# Patient Record
Sex: MALE | Race: WHITE | NOT HISPANIC OR LATINO | URBAN - METROPOLITAN AREA
[De-identification: names, ages, dates, MRNs, and addresses within clinical notes are randomized per-mention and may not be internally consistent; named-entity substitution may affect disease eponyms.]

---

## 2019-11-17 ENCOUNTER — INPATIENT (INPATIENT)
Facility: HOSPITAL | Age: 54
LOS: 0 days | Discharge: HOME | End: 2019-11-18
Attending: SURGERY | Admitting: SURGERY
Payer: COMMERCIAL

## 2019-11-17 VITALS
SYSTOLIC BLOOD PRESSURE: 161 MMHG | TEMPERATURE: 97 F | RESPIRATION RATE: 20 BRPM | DIASTOLIC BLOOD PRESSURE: 87 MMHG | HEART RATE: 125 BPM | OXYGEN SATURATION: 96 %

## 2019-11-17 LAB
ALBUMIN SERPL ELPH-MCNC: 4.6 G/DL — SIGNIFICANT CHANGE UP (ref 3.5–5.2)
ALP SERPL-CCNC: 68 U/L — SIGNIFICANT CHANGE UP (ref 30–115)
ALT FLD-CCNC: 24 U/L — SIGNIFICANT CHANGE UP (ref 0–41)
ANION GAP SERPL CALC-SCNC: 17 MMOL/L — HIGH (ref 7–14)
ANION GAP SERPL CALC-SCNC: 19 MMOL/L — HIGH (ref 7–14)
APTT BLD: 27.4 SEC — SIGNIFICANT CHANGE UP (ref 27–39.2)
AST SERPL-CCNC: 21 U/L — SIGNIFICANT CHANGE UP (ref 0–41)
BASOPHILS # BLD AUTO: 0.05 K/UL — SIGNIFICANT CHANGE UP (ref 0–0.2)
BASOPHILS NFR BLD AUTO: 0.3 % — SIGNIFICANT CHANGE UP (ref 0–1)
BILIRUB SERPL-MCNC: 0.3 MG/DL — SIGNIFICANT CHANGE UP (ref 0.2–1.2)
BLD GP AB SCN SERPL QL: SIGNIFICANT CHANGE UP
BUN SERPL-MCNC: 14 MG/DL — SIGNIFICANT CHANGE UP (ref 10–20)
BUN SERPL-MCNC: 15 MG/DL — SIGNIFICANT CHANGE UP (ref 10–20)
CALCIUM SERPL-MCNC: 9.5 MG/DL — SIGNIFICANT CHANGE UP (ref 8.5–10.1)
CALCIUM SERPL-MCNC: 9.5 MG/DL — SIGNIFICANT CHANGE UP (ref 8.5–10.1)
CHLORIDE SERPL-SCNC: 93 MMOL/L — LOW (ref 98–110)
CHLORIDE SERPL-SCNC: 98 MMOL/L — SIGNIFICANT CHANGE UP (ref 98–110)
CO2 SERPL-SCNC: 21 MMOL/L — SIGNIFICANT CHANGE UP (ref 17–32)
CO2 SERPL-SCNC: 23 MMOL/L — SIGNIFICANT CHANGE UP (ref 17–32)
CREAT SERPL-MCNC: 0.9 MG/DL — SIGNIFICANT CHANGE UP (ref 0.7–1.5)
CREAT SERPL-MCNC: 1.2 MG/DL — SIGNIFICANT CHANGE UP (ref 0.7–1.5)
EOSINOPHIL # BLD AUTO: 0.1 K/UL — SIGNIFICANT CHANGE UP (ref 0–0.7)
EOSINOPHIL NFR BLD AUTO: 0.6 % — SIGNIFICANT CHANGE UP (ref 0–8)
ETHANOL SERPL-MCNC: <10 MG/DL — SIGNIFICANT CHANGE UP
GLUCOSE SERPL-MCNC: 116 MG/DL — HIGH (ref 70–99)
GLUCOSE SERPL-MCNC: 294 MG/DL — HIGH (ref 70–99)
HCT VFR BLD CALC: 48.7 % — SIGNIFICANT CHANGE UP (ref 42–52)
HCT VFR BLD CALC: 50 % — SIGNIFICANT CHANGE UP (ref 42–52)
HGB BLD-MCNC: 16.6 G/DL — SIGNIFICANT CHANGE UP (ref 14–18)
HGB BLD-MCNC: 17.3 G/DL — SIGNIFICANT CHANGE UP (ref 14–18)
IMM GRANULOCYTES NFR BLD AUTO: 0.4 % — HIGH (ref 0.1–0.3)
INR BLD: 1.13 RATIO — SIGNIFICANT CHANGE UP (ref 0.65–1.3)
LACTATE SERPL-SCNC: 3.5 MMOL/L — HIGH (ref 0.5–2.2)
LIDOCAIN IGE QN: 48 U/L — SIGNIFICANT CHANGE UP (ref 7–60)
LYMPHOCYTES # BLD AUTO: 13.1 % — LOW (ref 20.5–51.1)
LYMPHOCYTES # BLD AUTO: 2.07 K/UL — SIGNIFICANT CHANGE UP (ref 1.2–3.4)
MAGNESIUM SERPL-MCNC: 1.9 MG/DL — SIGNIFICANT CHANGE UP (ref 1.8–2.4)
MCHC RBC-ENTMCNC: 31.4 PG — HIGH (ref 27–31)
MCHC RBC-ENTMCNC: 31.5 PG — HIGH (ref 27–31)
MCHC RBC-ENTMCNC: 34.1 G/DL — SIGNIFICANT CHANGE UP (ref 32–37)
MCHC RBC-ENTMCNC: 34.6 G/DL — SIGNIFICANT CHANGE UP (ref 32–37)
MCV RBC AUTO: 90.9 FL — SIGNIFICANT CHANGE UP (ref 80–94)
MCV RBC AUTO: 92.1 FL — SIGNIFICANT CHANGE UP (ref 80–94)
MONOCYTES # BLD AUTO: 0.89 K/UL — HIGH (ref 0.1–0.6)
MONOCYTES NFR BLD AUTO: 5.6 % — SIGNIFICANT CHANGE UP (ref 1.7–9.3)
NEUTROPHILS # BLD AUTO: 12.66 K/UL — HIGH (ref 1.4–6.5)
NEUTROPHILS NFR BLD AUTO: 80 % — HIGH (ref 42.2–75.2)
NRBC # BLD: 0 /100 WBCS — SIGNIFICANT CHANGE UP (ref 0–0)
NRBC # BLD: 0 /100 WBCS — SIGNIFICANT CHANGE UP (ref 0–0)
PHOSPHATE SERPL-MCNC: 3.9 MG/DL — SIGNIFICANT CHANGE UP (ref 2.1–4.9)
PLATELET # BLD AUTO: 223 K/UL — SIGNIFICANT CHANGE UP (ref 130–400)
PLATELET # BLD AUTO: 231 K/UL — SIGNIFICANT CHANGE UP (ref 130–400)
POTASSIUM SERPL-MCNC: 3.8 MMOL/L — SIGNIFICANT CHANGE UP (ref 3.5–5)
POTASSIUM SERPL-MCNC: 3.9 MMOL/L — SIGNIFICANT CHANGE UP (ref 3.5–5)
POTASSIUM SERPL-SCNC: 3.8 MMOL/L — SIGNIFICANT CHANGE UP (ref 3.5–5)
POTASSIUM SERPL-SCNC: 3.9 MMOL/L — SIGNIFICANT CHANGE UP (ref 3.5–5)
PROT SERPL-MCNC: 7.6 G/DL — SIGNIFICANT CHANGE UP (ref 6–8)
PROTHROM AB SERPL-ACNC: 13 SEC — HIGH (ref 9.95–12.87)
RBC # BLD: 5.29 M/UL — SIGNIFICANT CHANGE UP (ref 4.7–6.1)
RBC # BLD: 5.5 M/UL — SIGNIFICANT CHANGE UP (ref 4.7–6.1)
RBC # FLD: 11.9 % — SIGNIFICANT CHANGE UP (ref 11.5–14.5)
RBC # FLD: 12 % — SIGNIFICANT CHANGE UP (ref 11.5–14.5)
SODIUM SERPL-SCNC: 131 MMOL/L — LOW (ref 135–146)
SODIUM SERPL-SCNC: 140 MMOL/L — SIGNIFICANT CHANGE UP (ref 135–146)
WBC # BLD: 15.19 K/UL — HIGH (ref 4.8–10.8)
WBC # BLD: 15.83 K/UL — HIGH (ref 4.8–10.8)
WBC # FLD AUTO: 15.19 K/UL — HIGH (ref 4.8–10.8)
WBC # FLD AUTO: 15.83 K/UL — HIGH (ref 4.8–10.8)

## 2019-11-17 PROCEDURE — 70486 CT MAXILLOFACIAL W/O DYE: CPT | Mod: 26

## 2019-11-17 PROCEDURE — 71045 X-RAY EXAM CHEST 1 VIEW: CPT | Mod: 26

## 2019-11-17 PROCEDURE — 72125 CT NECK SPINE W/O DYE: CPT | Mod: 26

## 2019-11-17 PROCEDURE — 99223 1ST HOSP IP/OBS HIGH 75: CPT

## 2019-11-17 PROCEDURE — 12002 RPR S/N/AX/GEN/TRNK2.6-7.5CM: CPT

## 2019-11-17 PROCEDURE — 72170 X-RAY EXAM OF PELVIS: CPT | Mod: 26

## 2019-11-17 PROCEDURE — 70450 CT HEAD/BRAIN W/O DYE: CPT | Mod: 26

## 2019-11-17 PROCEDURE — 99291 CRITICAL CARE FIRST HOUR: CPT | Mod: 25

## 2019-11-17 RX ORDER — ONDANSETRON 8 MG/1
4 TABLET, FILM COATED ORAL EVERY 6 HOURS
Refills: 0 | Status: DISCONTINUED | OUTPATIENT
Start: 2019-11-17 | End: 2019-11-18

## 2019-11-17 RX ORDER — ACETAMINOPHEN 500 MG
650 TABLET ORAL EVERY 6 HOURS
Refills: 0 | Status: DISCONTINUED | OUTPATIENT
Start: 2019-11-17 | End: 2019-11-18

## 2019-11-17 RX ORDER — FAMOTIDINE 10 MG/ML
20 INJECTION INTRAVENOUS
Refills: 0 | Status: DISCONTINUED | OUTPATIENT
Start: 2019-11-17 | End: 2019-11-18

## 2019-11-17 RX ORDER — INFLUENZA VIRUS VACCINE 15; 15; 15; 15 UG/.5ML; UG/.5ML; UG/.5ML; UG/.5ML
0.5 SUSPENSION INTRAMUSCULAR ONCE
Refills: 0 | Status: DISCONTINUED | OUTPATIENT
Start: 2019-11-17 | End: 2019-11-18

## 2019-11-17 RX ORDER — LEVETIRACETAM 250 MG/1
1000 TABLET, FILM COATED ORAL ONCE
Refills: 0 | Status: COMPLETED | OUTPATIENT
Start: 2019-11-17 | End: 2019-11-17

## 2019-11-17 RX ORDER — LEVETIRACETAM 250 MG/1
500 TABLET, FILM COATED ORAL
Refills: 0 | Status: DISCONTINUED | OUTPATIENT
Start: 2019-11-17 | End: 2019-11-18

## 2019-11-17 RX ORDER — IBUPROFEN 200 MG
600 TABLET ORAL EVERY 6 HOURS
Refills: 0 | Status: DISCONTINUED | OUTPATIENT
Start: 2019-11-17 | End: 2019-11-18

## 2019-11-17 RX ORDER — SODIUM CHLORIDE 9 MG/ML
1000 INJECTION INTRAMUSCULAR; INTRAVENOUS; SUBCUTANEOUS
Refills: 0 | Status: DISCONTINUED | OUTPATIENT
Start: 2019-11-17 | End: 2019-11-18

## 2019-11-17 RX ADMIN — Medication 600 MILLIGRAM(S): at 21:11

## 2019-11-17 RX ADMIN — SODIUM CHLORIDE 100 MILLILITER(S): 9 INJECTION INTRAMUSCULAR; INTRAVENOUS; SUBCUTANEOUS at 19:23

## 2019-11-17 RX ADMIN — Medication 100 MILLIGRAM(S): at 20:31

## 2019-11-17 RX ADMIN — LEVETIRACETAM 400 MILLIGRAM(S): 250 TABLET, FILM COATED ORAL at 18:32

## 2019-11-17 RX ADMIN — Medication 600 MILLIGRAM(S): at 23:00
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
## 2019-11-17 NOTE — H&P ADULT - NSHPLABSRESULTS_GEN_ALL_CORE
Labs:  CAPILLARY BLOOD GLUCOSE      POCT Blood Glucose.: 261 mg/dL (17 Nov 2019 15:04)                          17.3   15.83 )-----------( 231      ( 17 Nov 2019 15:11 )             50.0       Auto Neutrophil %: 80.0 % (11-17-19 @ 15:11)  Auto Immature Granulocyte %: 0.4 % (11-17-19 @ 15:11)    11-17    131<L>  |  93<L>  |  15  ----------------------------<  294<H>  3.8   |  21  |  1.2      Calcium, Total Serum: 9.5 mg/dL (11-17-19 @ 15:11)      LFTs:             7.6  | 0.3  | 21       ------------------[68      ( 17 Nov 2019 15:11 )  4.6  | x    | 24          Lipase:48     Amylase:x         Lactate, Blood: 3.5 mmol/L (11-17-19 @ 15:11)      Coags:     13.00  ----< 1.13    ( 17 Nov 2019 15:11 )     27.4                < from: CT Cervical Spine No Cont (11.17.19 @ 16:36) >      No evidence of acute fracture, compression deformity or facet   subluxation.      < end of copied text >  < from: CT Head No Cont (11.17.19 @ 16:36) >    iMPRESSION:    1.  Nondisplaced fracture right temporal bone with focus of underlying   pneumocephalus.    2.  Right posterior temporoparietal lobe subarachnoid hemorrhage.    < end of copied text >  < from: CT Maxillofacial No Cont (11.17.19 @ 16:36) >    No evidence of acute fracture.    < end of copied text > Labs:  CAPILLARY BLOOD GLUCOSE      POCT Blood Glucose.: 261 mg/dL (17 Nov 2019 15:04)                          17.3   15.83 )-----------( 231      ( 17 Nov 2019 15:11 )             50.0       Auto Neutrophil %: 80.0 % (11-17-19 @ 15:11)  Auto Immature Granulocyte %: 0.4 % (11-17-19 @ 15:11)    11-17    131<L>  |  93<L>  |  15  ----------------------------<  294<H>  3.8   |  21  |  1.2      Calcium, Total Serum: 9.5 mg/dL (11-17-19 @ 15:11)      LFTs:             7.6  | 0.3  | 21       ------------------[68      ( 17 Nov 2019 15:11 )  4.6  | x    | 24          Lipase:48     Amylase:x         Lactate, Blood: 3.5 mmol/L (11-17-19 @ 15:11)      Coags:     13.00  ----< 1.13    ( 17 Nov 2019 15:11 )     27.4       IMAGING  < from: CT Cervical Spine No Cont (11.17.19 @ 16:36) >  No evidence of acute fracture, compression deformity or facet   subluxation.    < end of copied text >    < from: CT Head No Cont (11.17.19 @ 16:36) >  iMPRESSION:  1.  Nondisplaced fracture right temporal bone with focus of underlying   pneumocephalus.  2.  Right posterior temporoparietal lobe subarachnoid hemorrhage.  < end of copied text >    < from: CT Maxillofacial No Cont (11.17.19 @ 16:36) >  No evidence of acute fracture.  < end of copied text >

## 2019-11-17 NOTE — ED PROVIDER NOTE - DIAGNOSTIC INTERPRETATION
ER Physician: FLAVIO Prieto M.D.  CHEST XRAY INTERPRETATION: lungs clear, heart shadow normal, bony structures intact. no rib fx or ptx.
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
## 2019-11-17 NOTE — ED PROVIDER NOTE - PROGRESS NOTE DETAILS
NSX consulted s/o Dr. Hollingsworth, f/u CT and trauma consult PT C/O DECREASED HEARING FROM R EAR AND CONTINUED BLEEDING. TRAUMA AWARE. ENT CONSULTED. NEUROSURGERY PA AT PTS BEDSIDE, ADMIT TO TRAUMA FLOOR, BETTE PAGAN, WILL REASSESS.
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
## 2019-11-17 NOTE — CONSULT NOTE ADULT - ATTENDING COMMENTS
Pt seen and examined on rounds. Agree with above. No neurosurgical intervention at this time.
see admission H&P
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
## 2019-11-17 NOTE — ED PROVIDER NOTE - NS ED ROS FT
Constitutional: No altered mental status.  Eyes: No visual changes.  ENT: +r sided hearing loss  Neck: No neck pain or stiffness.  Cardiovascular: No chest pain, palpitations.  Pulmonary: No SOB. No hemoptysis.  Abdominal: No abdominal pain, nausea, vomiting.   : No hematuria.  Neuro: No headache, syncope, dizziness. +Head trauma  MS: No back pain. No deformities.  Psych: No suicidal ideations.
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
## 2019-11-17 NOTE — ED ADULT NURSE NOTE - NSIMPLEMENTINTERV_GEN_ALL_ED
Implemented All Universal Safety Interventions:  [place] to call system. Call bell, personal items and telephone within reach. Instruct patient to call for assistance. Room bathroom lighting operational. Non-slip footwear when patient is off stretcher. Physically safe environment: no spills, clutter or unnecessary equipment. Stretcher in lowest position, wheels locked, appropriate side rails in place.
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
## 2019-11-17 NOTE — ED PROVIDER NOTE - PHYSICAL EXAMINATION
Constitutional: Well developed, well nourished. NAD  TRAUMA: ABC intact. GCS 15.   Head: Normocephalic, 3-4 cm laceration to R temporal bone  Eyes: PERRL. EOMI. No Raccoon eyes.   ENT: No nasal discharge. No septal hematoma. No Khan sign. Mucous membranes moist. +R hemotympanum  Neck: Supple. Painless ROM. No midline tenderness, stepoffs.  Cardiovascular: Normal S1, S2. Regular rate and rhythm. No murmurs, rubs, or gallops.  Pulmonary: Normal respiratory rate and effort. Lungs clear to auscultation bilaterally. No wheezing, rales, or rhonchi.  CHEST: No chest wall tenderness, crepitus.  Abdominal: Soft. Nondistended. Nontender. No rebound, guarding, rigidity.  BACK: No midline T/L/S tenderness, stepoffs. No saddle paresthesia.  Extremities. Pelvis stable. No traumatic deformities, tenderness of extremities.  Skin: No rashes, cyanosis, abrasions.  Neuro: AAOx3. Strength 5/5 in all extremities. Sensation intact throughout. No focal neurological deficits.  Psych: Normal mood. Normal affect.

## 2019-11-18 VITALS
OXYGEN SATURATION: 98 % | HEART RATE: 83 BPM | TEMPERATURE: 98 F | SYSTOLIC BLOOD PRESSURE: 141 MMHG | RESPIRATION RATE: 18 BRPM | DIASTOLIC BLOOD PRESSURE: 74 MMHG

## 2019-11-18 PROCEDURE — 99223 1ST HOSP IP/OBS HIGH 75: CPT

## 2019-11-18 PROCEDURE — 70450 CT HEAD/BRAIN W/O DYE: CPT | Mod: 26

## 2019-11-18 PROCEDURE — 99232 SBSQ HOSP IP/OBS MODERATE 35: CPT

## 2019-11-18 PROCEDURE — 70480 CT ORBIT/EAR/FOSSA W/O DYE: CPT | Mod: 26,59

## 2019-11-18 PROCEDURE — 99222 1ST HOSP IP/OBS MODERATE 55: CPT

## 2019-11-18 RX ORDER — LEVETIRACETAM 250 MG/1
1 TABLET, FILM COATED ORAL
Qty: 14 | Refills: 0
Start: 2019-11-18 | End: 2019-11-24

## 2019-11-18 RX ORDER — LEVETIRACETAM 250 MG/1
1 TABLET, FILM COATED ORAL
Qty: 0 | Refills: 0 | DISCHARGE
Start: 2019-11-18

## 2019-11-18 RX ADMIN — Medication 600 MILLIGRAM(S): at 08:04

## 2019-11-18 RX ADMIN — LEVETIRACETAM 500 MILLIGRAM(S): 250 TABLET, FILM COATED ORAL at 08:04

## 2019-11-18 NOTE — PROGRESS NOTE ADULT - ATTENDING COMMENTS
I examined the patient with the pa and resident and discussed my plan with them    the patient is neurologically stable, asymptomatic, and ready for discharge.  consultant services can see in oupatient setting for follow-up
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
## 2019-11-18 NOTE — PROGRESS NOTE ADULT - SUBJECTIVE AND OBJECTIVE BOX
GENERAL SURGERY PROGRESS NOTE     KENY ODOM  54y  Male  Hospital day :1d  OVERNIGHT EVENTS: no acute events overnight. Patient was seen by neurosx and ENT.    T(F): 97.9 (11-18-19 @ 07:46), Max: 98.4 (11-18-19 @ 00:29)  HR: 83 (11-18-19 @ 07:46) (76 - 125)  BP: 141/74 (11-18-19 @ 07:46) (135/75 - 161/87)  RR: 18 (11-18-19 @ 07:46) (18 - 20)  SpO2: 98% (11-18-19 @ 07:46) (96% - 99%)    DIET/FLUIDS: sodium chloride 0.9%. 1000 milliLiter(s) IV Continuous <Continuous>    GI proph:  famotidine    Tablet 20 milliGRAM(s) Oral two times a day    PHYSICAL EXAM:  GENERAL: NAD, well-appearing  HEENT: right scalp lac, stapled, no bleeding  CHEST/LUNG: Clear to auscultation bilaterally  HEART: Regular rate and rhythm  ABDOMEN: Soft, Nontender, Nondistended;   EXTREMITIES:  No clubbing, cyanosis, or edema      LABS  Labs:  CAPILLARY BLOOD GLUCOSE      POCT Blood Glucose.: 261 mg/dL (17 Nov 2019 15:04)                          16.6   15.19 )-----------( 223      ( 17 Nov 2019 20:03 )             48.7       Auto Neutrophil %: 80.0 % (11-17-19 @ 15:11)  Auto Immature Granulocyte %: 0.4 % (11-17-19 @ 15:11)    11-17    140  |  98  |  14  ----------------------------<  116<H>  3.9   |  23  |  0.9      Calcium, Total Serum: 9.5 mg/dL (11-17-19 @ 20:03)      LFTs:             7.6  | 0.3  | 21       ------------------[68      ( 17 Nov 2019 15:11 )  4.6  | x    | 24          Lipase:48     Amylase:x         Lactate, Blood: 3.5 mmol/L (11-17-19 @ 15:11)      Coags:     13.00  ----< 1.13    ( 17 Nov 2019 15:11 )     27.4      Alcohol, Blood: <10 mg/dL (11-17-19 @ 15:11)    RADIOLOGY & ADDITIONAL TESTS:  < from: CT Cervical Spine No Cont (11.17.19 @ 16:36) >  IMPRESSION:    No evidence of acute fracture, compression deformity or facet   subluxation.      < end of copied text >    < from: CT Maxillofacial No Cont (11.17.19 @ 16:36) >  IMPRESSION:    No evidence of acute fracture.    < end of copied text >    < from: CT Head No Cont (11.17.19 @ 16:36) >  IMPRESSION:    1.  Nondisplaced fracture right temporal bone with focus of underlying   pneumocephalus.    2.  Right posterior temporoparietal lobe subarachnoid hemorrhage.    These findings discussed with neurosurgery LEXI Little on the   11/17/2019 at 5:41 PM.    < end of copied text >
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
## 2019-11-21 DIAGNOSIS — Z83.3 FAMILY HISTORY OF DIABETES MELLITUS: ICD-10-CM

## 2019-11-21 DIAGNOSIS — S02.19XA OTHER FRACTURE OF BASE OF SKULL, INITIAL ENCOUNTER FOR CLOSED FRACTURE: ICD-10-CM

## 2019-11-21 DIAGNOSIS — S01.01XA LACERATION WITHOUT FOREIGN BODY OF SCALP, INITIAL ENCOUNTER: ICD-10-CM
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
## 2019-11-21 DIAGNOSIS — S06.6X0A TRAUMATIC SUBARACHNOID HEMORRHAGE WITHOUT LOSS OF CONSCIOUSNESS, INITIAL ENCOUNTER: ICD-10-CM

## 2019-11-25 ENCOUNTER — EMERGENCY (EMERGENCY)
Facility: HOSPITAL | Age: 54
LOS: 0 days | Discharge: HOME | End: 2019-11-25
Attending: EMERGENCY MEDICINE | Admitting: EMERGENCY MEDICINE

## 2019-11-25 VITALS — WEIGHT: 250 LBS

## 2019-11-25 VITALS
OXYGEN SATURATION: 99 % | RESPIRATION RATE: 17 BRPM | DIASTOLIC BLOOD PRESSURE: 83 MMHG | HEART RATE: 100 BPM | TEMPERATURE: 97 F | SYSTOLIC BLOOD PRESSURE: 129 MMHG

## 2019-11-25 DIAGNOSIS — S01.01XD LACERATION WITHOUT FOREIGN BODY OF SCALP, SUBSEQUENT ENCOUNTER: ICD-10-CM
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
## 2019-11-25 PROBLEM — I10 ESSENTIAL (PRIMARY) HYPERTENSION: Chronic | Status: ACTIVE | Noted: 2019-11-17

## 2019-11-25 PROBLEM — E11.9 TYPE 2 DIABETES MELLITUS WITHOUT COMPLICATIONS: Chronic | Status: ACTIVE | Noted: 2019-11-17

## 2019-11-25 NOTE — ED PROVIDER NOTE - CLINICAL SUMMARY MEDICAL DECISION MAKING FREE TEXT BOX
pt here for staple removal.  pt has no other complaints.  No evidence of wound infection.  no bleeding.  staples removed.  pt dc with outpatient follow up.

## 2019-11-25 NOTE — ED PROVIDER NOTE - PHYSICAL EXAMINATION
CONST: Well appearing in NAD  HEAD: healing 4 cm laceration right parietal scalp, no overlying skin changes, intact staples, no TTP.   EYES: Sclera and conjunctiva clear.  NECK: Non-tender, no meningeal signs, supple  SKIN: Warm, dry, see head exam   NEURO: A&Ox3, No focal deficits. Strength 5/5 with no sensory deficits. Steady gait
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
## 2019-11-25 NOTE — ED ADULT NURSE NOTE - NSIMPLEMENTINTERV_GEN_ALL_ED
Implemented All Fall Risk Interventions:  [place] to call system. Call bell, personal items and telephone within reach. Instruct patient to call for assistance. Room bathroom lighting operational. Non-slip footwear when patient is off stretcher. Physically safe environment: no spills, clutter or unnecessary equipment. Stretcher in lowest position, wheels locked, appropriate side rails in place. Provide visual cue, wrist band, yellow gown, etc. Monitor gait and stability. Monitor for mental status changes and reorient to person, place, and time. Review medications for side effects contributing to fall risk. Reinforce activity limits and safety measures with patient and family.

## 2019-11-25 NOTE — ED PROVIDER NOTE - NSFOLLOWUPINSTRUCTIONS_ED_ALL_ED_FT
Suture/Staple Removal    After having your stitches or staples removed it is typical to have minor discomfort, swelling, or redness in the area. The wound is still healing so continue to protect it from injury. Keep the wound dry and if given creams, ointments, or medication, take as instructed to by your health care professional.    SEEK IMMEDIATE MEDICAL CARE IF YOU HAVE ANY OF THE FOLLOWING SYMPTOMS: increasing redness/swelling/pain in the wound, pus coming from the wound, bad smell coming from the wound, or fever.    Follow up with your primary medical doctor in 1-2 days

## 2019-11-25 NOTE — ED PROVIDER NOTE - NS ED ROS FT
CONST: No fever, chills or bodyaches  SKIN: + healing laceration.   NEURO: No headache, dizziness, paresthesias or LOC

## 2020-06-30 NOTE — PATIENT PROFILE ADULT - NSPROGENDIFFINTUB_GEN_A_NUR
Bill For Surgical Tray: no X Size Of Lesion In Cm (Optional): 0 Anesthesia Type: 1% lidocaine with epinephrine Billing Type: Third-Party Bill Wound Care: Petrolatum Consent was obtained from the patient. The risks and benefits to therapy were discussed in detail. Specifically, the risks of infection, scarring, bleeding, prolonged wound healing, incomplete removal, allergy to anesthesia, nerve injury and recurrence were addressed. Prior to the procedure, the treatment site was clearly identified and confirmed by the patient. All components of Universal Protocol/PAUSE Rule completed. Path Notes (To The Dermatopathologist): Please check margins. Post-Care Instructions: I reviewed with the patient in detail post-care instructions. Patient is to keep the biopsy site dry overnight, and then apply bacitracin twice daily until healed. Patient may apply hydrogen peroxide soaks to remove any crusting. Was A Bandage Applied: Yes Anesthesia Volume In Cc: 0.5 Hemostasis: Drysol Medical Necessity Clause: This procedure was medically necessary because the lesion that was treated was: Notification Instructions: Patient will be notified of biopsy results. However, patient instructed to call the office if not contacted within 2 weeks. Detail Level: Detailed Medical Necessity Information: It is in your best interest to select a reason for this procedure from the list below. All of these items fulfill various CMS LCD requirements except the new and changing color options. Biopsy Method: Dermablade never intubated

## 2021-07-08 NOTE — PATIENT PROFILE ADULT - HARM RISK FACTORS
Airway patent, TM normal bilaterally, normal appearing mouth, nose, throat, neck supple with full range of motion, no cervical adenopathy.
no

## 2021-11-01 ENCOUNTER — TRANSCRIPTION ENCOUNTER (OUTPATIENT)
Age: 56
End: 2021-11-01

## 2022-07-06 NOTE — ED PROVIDER NOTE - INTERNATIONAL TRAVEL
[Breast Self Exam] : breast self exam [Contraception/ Emergency Contraception/ Safe Sexual Practices] : contraception, emergency contraception, safe sexual practices No

## 2023-07-21 ENCOUNTER — EMERGENCY (EMERGENCY)
Facility: HOSPITAL | Age: 58
LOS: 0 days | Discharge: HOME | End: 2023-07-21
Attending: EMERGENCY MEDICINE
Payer: MEDICAID

## 2023-07-21 VITALS
RESPIRATION RATE: 18 BRPM | HEART RATE: 89 BPM | TEMPERATURE: 98 F | OXYGEN SATURATION: 95 % | DIASTOLIC BLOOD PRESSURE: 101 MMHG | WEIGHT: 154.98 LBS | SYSTOLIC BLOOD PRESSURE: 154 MMHG | HEIGHT: 68 IN

## 2023-07-21 VITALS
HEART RATE: 84 BPM | SYSTOLIC BLOOD PRESSURE: 148 MMHG | OXYGEN SATURATION: 96 % | DIASTOLIC BLOOD PRESSURE: 90 MMHG | RESPIRATION RATE: 18 BRPM
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
## 2023-07-21 DIAGNOSIS — E11.9 TYPE 2 DIABETES MELLITUS WITHOUT COMPLICATIONS: ICD-10-CM

## 2023-07-21 DIAGNOSIS — R07.81 PLEURODYNIA: ICD-10-CM

## 2023-07-21 DIAGNOSIS — I10 ESSENTIAL (PRIMARY) HYPERTENSION: ICD-10-CM

## 2023-07-21 DIAGNOSIS — S29.011A STRAIN OF MUSCLE AND TENDON OF FRONT WALL OF THORAX, INITIAL ENCOUNTER: ICD-10-CM
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
## 2023-07-21 PROCEDURE — 71101 X-RAY EXAM UNILAT RIBS/CHEST: CPT | Mod: RT

## 2023-07-21 PROCEDURE — 71101 X-RAY EXAM UNILAT RIBS/CHEST: CPT | Mod: 26,RT

## 2023-07-21 PROCEDURE — 99284 EMERGENCY DEPT VISIT MOD MDM: CPT

## 2023-07-21 PROCEDURE — 99283 EMERGENCY DEPT VISIT LOW MDM: CPT | Mod: 25

## 2023-07-21 RX ORDER — IBUPROFEN 200 MG
1 TABLET ORAL
Qty: 30 | Refills: 2
Start: 2023-07-21 | End: 2023-08-19

## 2023-07-21 RX ORDER — LIDOCAINE 4 G/100G
1 CREAM TOPICAL ONCE
Refills: 0 | Status: COMPLETED | OUTPATIENT
Start: 2023-07-21 | End: 2023-07-21

## 2023-07-21 RX ORDER — LISINOPRIL/HYDROCHLOROTHIAZIDE 10-12.5 MG
0 TABLET ORAL
Qty: 90 | Refills: 0 | DISCHARGE

## 2023-07-21 RX ORDER — METFORMIN HYDROCHLORIDE 850 MG/1
0 TABLET ORAL
Qty: 180 | Refills: 0 | DISCHARGE

## 2023-07-21 RX ORDER — ATORVASTATIN CALCIUM 80 MG/1
0 TABLET, FILM COATED ORAL
Qty: 90 | Refills: 0 | DISCHARGE

## 2023-07-21 RX ORDER — LIDOCAINE 4 G/100G
1 CREAM TOPICAL
Qty: 7 | Refills: 0
Start: 2023-07-21 | End: 2023-07-27

## 2023-07-21 RX ORDER — ALBUTEROL 90 UG/1
0 AEROSOL, METERED ORAL
Qty: 8.5 | Refills: 0 | DISCHARGE

## 2023-07-21 RX ADMIN — LIDOCAINE 1 PATCH: 4 CREAM TOPICAL at 17:36

## 2023-07-21 RX ADMIN — LIDOCAINE 1 PATCH: 4 CREAM TOPICAL at 16:30
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
## 2023-07-21 NOTE — ED PROVIDER NOTE - PHYSICAL EXAMINATION
CONST: Well appearing in NAD  EYES: PERRL, EOMI, Sclera and conjunctiva clear.   ENT: No nasal discharge. Oropharynx normal appearing  NECK: Non-tender, no meningeal signs. normal ROM. supple   CARD: Normal S1 S2; Normal rate and rhythm  RESP: Equal BS B/L, No wheezes, rhonchi or rales. No distress  GI: Soft, non-tender, non-distended. no cva tenderness. normal BS  MS: tenderness to right lower posterior ribs. Normal ROM in all extremities. No midline spinal tenderness. pulses 2 +. no calf tenderness or swelling  SKIN: Warm, dry, no acute rashes. Good turgor
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
## 2023-07-21 NOTE — ED PROVIDER NOTE - PATIENT PORTAL LINK FT
You can access the FollowMyHealth Patient Portal offered by [facility] by registering at the following website: [email or web address]. By joining [facility]’s FollowMyHealth portal, you will also be able to view your health information using other applications (apps) compatible with our system.

## 2023-07-21 NOTE — ED ADULT NURSE NOTE - NSFALLUNIVINTERV_ED_ALL_ED
Bed/Stretcher in lowest position, wheels locked, appropriate side rails in place/Call bell, personal items and telephone in reach/Instruct patient to call for assistance before getting out of bed/chair/stretcher/Non-slip footwear applied when patient is off stretcher/[place] to call system/Physically safe environment - no spills, clutter or unnecessary equipment/Purposeful proactive rounding/Room/bathroom lighting operational, light cord in reach

## 2023-09-15 NOTE — ED PROCEDURE NOTE - PROCEDURE DATE TIME, MLM
Encounter Date: 9/14/2023       History     Chief Complaint   Patient presents with    Arm Swelling     RUE edema after PICC removal 2 days ago.     56-year-old female presents to ED for right upper extremity swelling and chest pain with shortness of breath.  Had her RUE PICC line removed 2 days ago and the swelling started yesterday.  No trauma.  States the whole right arm is diffusely swollen.  Reports intact distal sensation and strength.  Denies any fevers or chills.  No other extremity involvement.  She states with this she also had episodes of intermittent shortness of breath and pleuritic discomfort.  No chest pressure heaviness, no fevers or chills, has no other complaints or concerns at this time.  No longer on anticoagulation.        Review of patient's allergies indicates:   Allergen Reactions    Latex      Past Medical History:   Diagnosis Date    Asthma     Diverticulosis     GERD (gastroesophageal reflux disease)      No past surgical history on file.  No family history on file.  Social History     Tobacco Use    Smoking status: Every Day     Current packs/day: 0.50     Average packs/day: 0.5 packs/day for 40.7 years (20.4 ttl pk-yrs)     Types: Cigarettes     Start date: 1983    Smokeless tobacco: Current    Tobacco comments:     Ambulatory referral to Smoking Cessation clinic following hospital discharge.    Substance Use Topics    Alcohol use: Not Currently    Drug use: Not Currently     Review of Systems   Constitutional:  Negative for chills, diaphoresis and fever.   HENT:  Negative for congestion, rhinorrhea, sinus pain and sore throat.    Eyes:  Negative for pain, discharge and itching.   Respiratory:  Positive for shortness of breath. Negative for cough and chest tightness.    Cardiovascular:  Positive for chest pain. Negative for palpitations.   Gastrointestinal:  Negative for abdominal pain, nausea and vomiting.   Genitourinary:  Negative for dysuria, flank pain and hematuria.    Musculoskeletal:  Negative for back pain and myalgias.        Generalized right upper extremity swelling   Skin:  Negative for color change and rash.   Neurological:  Negative for dizziness, weakness and headaches.   Psychiatric/Behavioral:  Negative for confusion. The patient is not hyperactive.        Physical Exam     Initial Vitals [09/14/23 1947]   BP Pulse Resp Temp SpO2   103/72 93 20 98.8 °F (37.1 °C) 98 %      MAP       --         Physical Exam    Vitals reviewed.  Constitutional: She appears well-developed and well-nourished. She is not diaphoretic. No distress.   Happy pleasant smiling.  No acute distress.   HENT:   Head: Normocephalic and atraumatic.   Eyes: Conjunctivae and EOM are normal. Pupils are equal, round, and reactive to light.   Neck: Neck supple. No tracheal deviation present.   Normal range of motion.  Cardiovascular:  Normal rate, regular rhythm, normal heart sounds and intact distal pulses.           Pulses:       Radial pulses are 2+ on the right side and 2+ on the left side.   Pulmonary/Chest: Breath sounds normal. No respiratory distress.   Abdominal: Abdomen is soft. There is no abdominal tenderness. There is no rebound and no guarding.   Musculoskeletal:         General: Normal range of motion.      Cervical back: Normal range of motion and neck supple.      Comments: Right upper extremity significantly larger than left.  Mild purple discoloration.  Has excellent 2+ radial, sensation and strength distally.  No redness, warmth or other suggestive findings of infection appreciated.     Neurological: She is alert and oriented to person, place, and time. She has normal strength. GCS score is 15. GCS eye subscore is 4. GCS verbal subscore is 5. GCS motor subscore is 6.   Skin: Skin is warm and dry. Capillary refill takes less than 2 seconds. No rash noted.   Psychiatric: Her behavior is normal. Judgment and thought content normal. Her mood appears anxious. Her speech is rapid and/or  25-Nov-2019 11:28 pressured.         ED Course   Procedures  Labs Reviewed   COMPREHENSIVE METABOLIC PANEL - Abnormal; Notable for the following components:       Result Value    Glucose Level 103 (*)     Globulin 3.6 (*)     Albumin/Globulin Ratio 1.0 (*)     All other components within normal limits   CBC WITH DIFFERENTIAL - Abnormal; Notable for the following components:    RBC 3.55 (*)     Hgb 11.9 (*)     Hct 35.9 (*)     .1 (*)     MCH 33.5 (*)     MPV 10.5 (*)     All other components within normal limits   PROTIME-INR - Normal   COVID/FLU A&B PCR - Normal    Narrative:     The Xpert Xpress SARS-CoV-2/FLU/RSV plus is a rapid, multiplexed real-time PCR test intended for the simultaneous qualitative detection and differentiation of SARS-CoV-2, Influenza A, Influenza B, and respiratory syncytial virus (RSV) viral RNA in either nasopharyngeal swab or nasal swab specimens.         TROPONIN I - Normal   CBC W/ AUTO DIFFERENTIAL    Narrative:     The following orders were created for panel order CBC auto differential.  Procedure                               Abnormality         Status                     ---------                               -----------         ------                     CBC with Differential[[id number]]       Abnormal            Final result                 Please view results for these tests on the individual orders.     EKG Readings: (Independently Interpreted)   My Independent EKG Interpretation  09/14/2023 10:56 PM  Rate: 85 bpm  Rhythm: Sinus  Axis: Normal  Intervals: normal  ST Changes: None  Impression: Normal sinus rhythm, normal EKG.           Imaging Results              CTA Chest Non-Coronary (PE Studies) (Preliminary result)  Result time 09/14/23 23:03:54      Preliminary result by Kevin Barreto Jr., MD (09/14/23 23:03:54)                   Narrative:    START OF REPORT:  Technique: CT Scan of the chest was performed with intravenous contrast with direct axial images as well as sagittal  and coronal reconstruction images pulmonary embolus protocol.    Dosage Information: Automated Exposure Control was utilized.    Comparison: Report available, but images not available.    Clinical History: Pe suspected.    Findings:  Soft Tissues: Unremarkable.  Lines and Tubes: None.  Neck: The visualized soft tissues of the neck appear unremarkable. The thyroid gland appear unremarkable.  Mediastinum: The mediastinal structures are within normal limits.  Heart: The heart size is within normal limits.  Aorta: Unremarkable appearing aorta.  Pulmonary Arteries: There are filling defects in the upper lobe branch of the right pulmonary artery with extension into the segmental and subsegmental branches of the apical and anterior segments. This is consistent with pulmonary embolism. No right heart strain is seen.  Lungs: There is mild non specific dependent change at the lung bases. There are a few non specific ground glass opacities in the right upper lobe with no specific findings to suggest pulmonary infarct. No acute focal infiltrate or consolidation is seen.  Pleura: No effusions or pneumothorax are identified.  Bony Structures:  Spine: The visualized dorsal spine appears unremarkable.  Abdomen: The visualized upper abdominal organs appear unremarkable.    Notifications: The results were discussed with the emergency room physician Dr. Garcia prior to dictation at 2023-09-14 23:39:50 CDT.      Impression:  1. There are filling defects in the upper lobe branch of the right pulmonary artery with extension into the segmental and subsegmental branches of the apical and anterior segments. This is consistent with pulmonary embolism. No right heart strain is seen.  2. Details and other findings as discussed above.                                         X-Ray Chest PA And Lateral (Final result)  Result time 09/14/23 22:35:15      Final result by Shayne Atkins MD (09/14/23 22:35:15)                   Impression:       No acute abnormality.      Electronically signed by: Shayne Atkins MD  Date:    09/14/2023  Time:    22:35               Narrative:    EXAMINATION:  XR CHEST PA AND LATERAL    CLINICAL HISTORY:  sob;    TECHNIQUE:  PA and lateral views of the chest were performed.    COMPARISON:  08/18/2023    FINDINGS:  The lungs are clear, with normal appearance of pulmonary vasculature and no pleural effusion or pneumothorax.    The cardiac silhouette is normal in size. The hilar and mediastinal contours are unremarkable.    Bones are intact.                                    US Right Upper extremity 09/14/23:  Conclusion    There was acute appearing occlusive thrombus within the right upper basilic vein from the proximal upper arm to the site of PICC line removal two days ago just above the mid upper arm.         Medications   iohexoL (OMNIPAQUE 350) injection 80 mL (80 mLs Intravenous Given 9/14/23 2249)     Medical Decision Making  Amount and/or Complexity of Data Reviewed  Labs: ordered.  Radiology: ordered.     Details: RUE CV US Conclusion:    There was acute appearing occlusive thrombus within the right upper basilic vein from the proximal upper arm to the site of PICC line removal two days ago just above the mid upper arm.     There was no scanned evidence of DVT within the visualized veins of the right upper arm.    Risk  Prescription drug management.               ED Course as of 09/15/23 0024   Thu Sep 14, 2023   2222 Had to restart IV given it was in the left hand.  Significant delay in chest x-ray and CTA.  Ultrasound was positive for right upper extremity DVT. [RZ]   2251 . [RZ]   Fri Sep 15, 2023   0012 Discussed with internal medicine regarding the patient.  This time, patient was very anxious about having a pulmonary embolism.  Patient may be a candidate for outpatient therapy, but appears that the patient would immediately come back to the emergency room out of acute anxiety.  Will opt for observation  admission to begin anticoagulation therapy, and any additional tests that internal medicine may desire. [MW]      ED Course User Index  [MW] Alek Garcia MD  [RZ] Wilver Ibrahim MD                    Clinical Impression:   Final diagnoses:  [R60.9] Swelling  [R06.02] SOB (shortness of breath)  [I26.99] Acute pulmonary embolism without acute cor pulmonale, unspecified pulmonary embolism type (Primary)  [I82.721] Chronic deep vein thrombosis (DVT) of brachial vein of right upper extremity        ED Disposition Condition    Observation Stable                Wilver Ibrahim MD  09/14/23 2251       Alek Garcia MD  09/15/23 0014       Alek Garcia MD  09/15/23 0024

## 2023-10-26 NOTE — ED PROCEDURE NOTE - ATTENDING CONTRIBUTION TO CARE
procedure performed by PA Clindamycin Pregnancy And Lactation Text: This medication can be used in pregnancy if certain situations. Clindamycin is also present in breast milk.

## 2023-12-29 ENCOUNTER — APPOINTMENT (OUTPATIENT)
Dept: NEUROLOGY | Facility: CLINIC | Age: 58
End: 2023-12-29

## 2024-05-22 NOTE — PATIENT PROFILE ADULT - NSPROSPIRITUALVALUESFT_GEN_A_NUR
Occupational Therapy  Facility/Department: [facility]  Rehabilitation Occupational Therapy Daily Treatment Note    Date: 24  Patient Name: Keith Oreilly       Room:   MRN: [id number]  Account: [id number]   : 1940  (83 y.o.) Gender: male                    Past Medical History:  has a past medical history of Arthritis, Atrial fibrillation (HCC), CAD (coronary artery disease), CHF (congestive heart failure) (HCC), Diabetes (HCC), Diverticulosis, Hyperlipidemia, Obesity, АННА on CPAP, Poor historian, and Renal stone.  Past Surgical History:   has a past surgical history that includes Appendectomy; other surgical history; Colonoscopy; Cardiac surgery; Cardiac catheterization (2020); joint replacement; and Bunionectomy (Right, 12/3/2020).    Restrictions  Restrictions/Precautions: General Precautions, Fall Risk, Contact Precautions, Up as Tolerated  Other position/activity restrictions: Up w/ assist, RUE IV, telemetry  Required Braces or Orthoses?: No    Subjective  Subjective: Pt in chair upon arrival and stated \"I'm ready for a nap.\" Pt agreeable to short session of therapy and then returning to bed.  Restrictions/Precautions: General Precautions;Fall Risk;Contact Precautions;Up as Tolerated             Objective     Cognition  Overall Cognitive Status: Exceptions  Arousal/Alertness: Appropriate responses to stimuli;Delayed responses to stimuli  Following Commands: Follows one step commands with increased time;Follows one step commands with repetition  Attention Span: Attends with cues to redirect  Memory: Decreased short term memory;Decreased recall of recent events  Safety Judgement: Decreased awareness of need for safety;Decreased awareness of need for assistance  Problem Solving: Decreased awareness of errors;Assistance required to correct errors made;Assistance required to identify errors made;Assistance required to implement solutions;Assistance required to generate  na

## 2024-07-18 NOTE — ED ADULT NURSE NOTE - NSFALLRSKOUTCOME_ED_ALL_ED
Current patient location: [address] [place] [id number]    Is the patient currently in the state of [place]? YES    Visit mode:VIDEO    If the visit is dropped, the patient can be reconnected by: VIDEO VISIT: Text to cell phone:   Telephone Information:   Mobile [phone]       Will anyone else be joining the visit? NO  (If patient encounters technical issues they should call [phone]    How would you like to obtain your AVS? MyChart    Are changes needed to the allergy or medication list? No    Are refills needed on medications prescribed by this physician? YES    Reason for visit: RECHECK    Vicki LOUIS       Universal Safety Interventions

## 2025-04-29 NOTE — ED ADULT TRIAGE NOTE - HEIGHT IN INCHES
Detail Level: Detailed Quality 431: Preventive Care And Screening: Unhealthy Alcohol Use - Screening: Patient not identified as an unhealthy alcohol user when screened for unhealthy alcohol use using a systematic screening method Quality 226: Preventive Care And Screening: Tobacco Use: Screening And Cessation Intervention: Patient screened for tobacco use and is an ex/non-smoker 8 Quality 130: Documentation Of Current Medications In The Medical Record: Current Medications Documented